# Patient Record
Sex: FEMALE
[De-identification: names, ages, dates, MRNs, and addresses within clinical notes are randomized per-mention and may not be internally consistent; named-entity substitution may affect disease eponyms.]

---

## 2023-08-24 ENCOUNTER — NON-APPOINTMENT (OUTPATIENT)
Age: 48
End: 2023-08-24

## 2023-10-20 ENCOUNTER — NON-APPOINTMENT (OUTPATIENT)
Age: 48
End: 2023-10-20

## 2024-01-08 ENCOUNTER — NON-APPOINTMENT (OUTPATIENT)
Age: 49
End: 2024-01-08

## 2024-01-26 ENCOUNTER — NON-APPOINTMENT (OUTPATIENT)
Age: 49
End: 2024-01-26

## 2024-03-17 ENCOUNTER — NON-APPOINTMENT (OUTPATIENT)
Age: 49
End: 2024-03-17

## 2024-04-23 PROBLEM — Z00.00 ENCOUNTER FOR PREVENTIVE HEALTH EXAMINATION: Status: ACTIVE | Noted: 2024-04-23

## 2024-04-25 ENCOUNTER — APPOINTMENT (OUTPATIENT)
Dept: OBGYN | Facility: CLINIC | Age: 49
End: 2024-04-25
Payer: COMMERCIAL

## 2024-04-25 VITALS
WEIGHT: 150 LBS | RESPIRATION RATE: 16 BRPM | HEART RATE: 90 BPM | TEMPERATURE: 98.9 F | HEIGHT: 70 IN | BODY MASS INDEX: 21.47 KG/M2 | OXYGEN SATURATION: 99 % | DIASTOLIC BLOOD PRESSURE: 90 MMHG | SYSTOLIC BLOOD PRESSURE: 133 MMHG

## 2024-04-25 DIAGNOSIS — Z30.9 ENCOUNTER FOR CONTRACEPTIVE MANAGEMENT, UNSPECIFIED: ICD-10-CM

## 2024-04-25 DIAGNOSIS — Z64.1 PROBLEMS RELATED TO MULTIPARITY: ICD-10-CM

## 2024-04-25 LAB
HCG UR QL: NEGATIVE
QUALITY CONTROL: YES

## 2024-04-25 PROCEDURE — 81025 URINE PREGNANCY TEST: CPT

## 2024-04-25 PROCEDURE — 99213 OFFICE O/P EST LOW 20 MIN: CPT

## 2024-04-26 ENCOUNTER — NON-APPOINTMENT (OUTPATIENT)
Age: 49
End: 2024-04-26

## 2024-04-28 NOTE — HISTORY OF PRESENT ILLNESS
[N] : Patient reports normal menses [Y] : Positive pregnancy history [Yes] : Patient has concerns regarding sex [Currently Active] : currently active [Men] : men [Mammogramdate] : 8/2023 [LMPDate] : 04/24/2024 [MensesFreq] : 28 [PGHxTotal] : 2 [MensesLength] : 5 [[facility] Corporation[facility]] : 2 [PGHxMultBirths] : 1

## 2024-04-28 NOTE — PLAN
[FreeTextEntry1] : Pt previously underwent a consultation for contraception management at the previous office. she has a h/o a CVA, and is not a candidate for a hormonal IUD. The patient presented for insertion of the paraguard IUD. The paraguard IUD was not available at this office. She was rescheduled to return in 1 week for insertion

## 2024-05-02 ENCOUNTER — APPOINTMENT (OUTPATIENT)
Dept: OBGYN | Facility: CLINIC | Age: 49
End: 2024-05-02
Payer: COMMERCIAL

## 2024-05-02 VITALS
WEIGHT: 150 LBS | SYSTOLIC BLOOD PRESSURE: 128 MMHG | DIASTOLIC BLOOD PRESSURE: 89 MMHG | HEART RATE: 83 BPM | HEIGHT: 70 IN | BODY MASS INDEX: 21.47 KG/M2

## 2024-05-02 DIAGNOSIS — Z30.430 ENCOUNTER FOR INSERTION OF INTRAUTERINE CONTRACEPTIVE DEVICE: ICD-10-CM

## 2024-05-02 PROCEDURE — 58300 INSERT INTRAUTERINE DEVICE: CPT

## 2024-05-02 NOTE — PROCEDURE
[IUD Placement] : intrauterine device (IUD) placement [Abnormal Uterine Bleeding] : abnormal uterine bleeding [Risks] : risks [Benefits] : benefits [Alternatives] : alternatives [de-identified] : Thickened endometrium

## 2024-05-03 ENCOUNTER — RESULT REVIEW (OUTPATIENT)
Age: 49
End: 2024-05-03

## 2024-05-03 ENCOUNTER — OUTPATIENT (OUTPATIENT)
Dept: OUTPATIENT SERVICES | Facility: HOSPITAL | Age: 49
LOS: 1 days | End: 2024-05-03
Payer: COMMERCIAL

## 2024-05-03 ENCOUNTER — APPOINTMENT (OUTPATIENT)
Dept: ULTRASOUND IMAGING | Facility: CLINIC | Age: 49
End: 2024-05-03

## 2024-05-03 DIAGNOSIS — Z00.00 ENCOUNTER FOR GENERAL ADULT MEDICAL EXAMINATION WITHOUT ABNORMAL FINDINGS: ICD-10-CM

## 2024-05-03 PROCEDURE — 76830 TRANSVAGINAL US NON-OB: CPT

## 2024-05-03 PROCEDURE — 76830 TRANSVAGINAL US NON-OB: CPT | Mod: 26

## 2024-05-06 ENCOUNTER — NON-APPOINTMENT (OUTPATIENT)
Age: 49
End: 2024-05-06

## 2024-05-06 DIAGNOSIS — Z92.89 PERSONAL HISTORY OF OTHER MEDICAL TREATMENT: ICD-10-CM

## 2024-05-06 DIAGNOSIS — N90.89 OTHER SPECIFIED NONINFLAMMATORY DISORDERS OF VULVA AND PERINEUM: ICD-10-CM

## 2024-05-06 DIAGNOSIS — Z78.9 OTHER SPECIFIED HEALTH STATUS: ICD-10-CM

## 2024-05-06 DIAGNOSIS — Z87.898 PERSONAL HISTORY OF OTHER SPECIFIED CONDITIONS: ICD-10-CM

## 2024-05-06 DIAGNOSIS — Z82.3 FAMILY HISTORY OF STROKE: ICD-10-CM

## 2024-06-07 ENCOUNTER — APPOINTMENT (OUTPATIENT)
Dept: OBGYN | Facility: CLINIC | Age: 49
End: 2024-06-07

## 2024-06-10 ENCOUNTER — APPOINTMENT (OUTPATIENT)
Dept: OBGYN | Facility: CLINIC | Age: 49
End: 2024-06-10
Payer: COMMERCIAL

## 2024-06-10 VITALS
BODY MASS INDEX: 30.21 KG/M2 | SYSTOLIC BLOOD PRESSURE: 120 MMHG | DIASTOLIC BLOOD PRESSURE: 80 MMHG | WEIGHT: 160 LBS | HEIGHT: 61 IN

## 2024-06-10 DIAGNOSIS — T83.89XA OTHER SPECIFIED COMPLICATION OF GENITOURINARY PROSTHETIC DEVICES, IMPLANTS AND GRAFTS, INITIAL ENCOUNTER: ICD-10-CM

## 2024-06-10 DIAGNOSIS — N92.0 OTHER SPECIFIED COMPLICATION OF GENITOURINARY PROSTHETIC DEVICES, IMPLANTS AND GRAFTS, INITIAL ENCOUNTER: ICD-10-CM

## 2024-06-10 PROCEDURE — 99213 OFFICE O/P EST LOW 20 MIN: CPT

## 2024-06-10 NOTE — PLAN
[FreeTextEntry1] : Para 2-0-0-3 status post insertion of ParaGard intrauterine device May 2 presents complaining of persistent vaginal bleeding since insertion.  The patient denies pain.  Her physical examination is unremarkable.  There is no evidence of endometritis or cervicitis.  A transvaginal pelvic ultrasound performed May 3, 2024 confirms proper positioning of the intrauterine device.  A 0.9 cm posterior intramural myoma was noted.  Nonetheless the patient was advised that this would not be the etiology of the bleeding.  The patient was counseled that abnormal uterine bleeding post insertion of intrauterine devices are common, and in time her bleeding profile should improve.  She denies signs or symptoms of anemia.  The plan is to begin a menstrual calendar diary.  The patient will follow-up in 3 months for review.  She was advised to contact me sooner with any problems.

## 2024-06-10 NOTE — HISTORY OF PRESENT ILLNESS
[TextBox_4] : 48-year-old  2 para 2-0-0-3 status post insertion of ParaGard intrauterine device May 2, 2024, presents complaining of persistent vaginal bleeding.  She denies pain.

## 2024-06-10 NOTE — REVIEW OF SYSTEMS
[FreeTextEntry8] : Persistent vaginal bleeding status post insertion of ParaGard intrauterine device

## 2024-06-10 NOTE — PHYSICAL EXAM
[Soft] : soft [Non-tender] : non-tender [Non-distended] : non-distended [No HSM] : No HSM [No Mass] : no mass [Labia Majora] : normal [Labia Minora] : normal [Moderate] : There was moderate vaginal bleeding [Normal] : normal [Uterine Adnexae] : normal

## 2024-08-06 ENCOUNTER — APPOINTMENT (OUTPATIENT)
Dept: OBGYN | Facility: CLINIC | Age: 49
End: 2024-08-06

## 2024-08-14 DIAGNOSIS — R92.30 DENSE BREASTS, UNSPECIFIED: ICD-10-CM

## 2024-09-12 ENCOUNTER — APPOINTMENT (OUTPATIENT)
Dept: OBGYN | Facility: CLINIC | Age: 49
End: 2024-09-12
Payer: COMMERCIAL

## 2024-09-12 VITALS
HEIGHT: 61 IN | HEART RATE: 81 BPM | DIASTOLIC BLOOD PRESSURE: 82 MMHG | WEIGHT: 150 LBS | SYSTOLIC BLOOD PRESSURE: 119 MMHG | BODY MASS INDEX: 28.32 KG/M2

## 2024-09-12 DIAGNOSIS — T83.9XXA UNSPECIFIED COMPLICATION OF GENITOURINARY PROSTHETIC DEVICE, IMPLANT AND GRAFT, INITIAL ENCOUNTER: ICD-10-CM

## 2024-09-12 DIAGNOSIS — N92.0 OTHER SPECIFIED COMPLICATION OF GENITOURINARY PROSTHETIC DEVICES, IMPLANTS AND GRAFTS, INITIAL ENCOUNTER: ICD-10-CM

## 2024-09-12 DIAGNOSIS — Z78.9 OTHER SPECIFIED HEALTH STATUS: ICD-10-CM

## 2024-09-12 DIAGNOSIS — T83.89XA OTHER SPECIFIED COMPLICATION OF GENITOURINARY PROSTHETIC DEVICES, IMPLANTS AND GRAFTS, INITIAL ENCOUNTER: ICD-10-CM

## 2024-09-12 PROCEDURE — 99213 OFFICE O/P EST LOW 20 MIN: CPT

## 2024-09-12 NOTE — HISTORY OF PRESENT ILLNESS
[TextBox_4] : 49-year-old  2 para  LMP: 2024, presents for a follow-up visit.  A ParaGard intrauterine device was inserted May 2, 2024.  At the first follow-up appointment, the patient complained of persistent vaginal bleeding.  A transvaginal pelvic ultrasound was performed which confirmed proper positioning of the device.  Nevertheless, on today's visit, the patient reports resolution of the abnormal uterine bleeding.  Her menstrual cycles are presently normal and interval and duration.  She denies dysmenorrhea.

## 2024-09-12 NOTE — PLAN
[FreeTextEntry1] : 49-year-old  2 para  status post insertion of ParaGard intrauterine device, initially complicated by prolonged uterine bleeding.  The symptoms have resolved.

## 2024-09-12 NOTE — PHYSICAL EXAM
[Chaperone Present] : A chaperone was present in the examining room during all aspects of the physical examination [Oriented x3] : oriented x3 [No Lesions] : no lesions  [Labia Majora] : normal [Labia Minora] : normal [No Bleeding] : There was no active vaginal bleeding [Normal] : normal [IUD String] : an IUD string was noted [FreeTextEntry2] : Appropriately responsive, alert, and no acute distress. [FreeTextEntry7] : Soft. Nondistended. Nontender. No CVA tenderness. No rebound or guarding. There are no palpable masses. [FreeTextEntry1] : External genitalia are within normal limits with no lesions visualized. [FreeTextEntry4] : Scant amount of blood was noted in the vaginal vault. No discharge or lesions noted. [FreeTextEntry5] : A speculum was inserted without any difficulty. The cervix was normal in appearance. IUD string was visible. No cervical motion tenderness. [FreeTextEntry6] : Bimanual examination is notable for a normal size, nontender uterus.  There were no palpable adnexal masses or adnexal tenderness.

## 2025-03-27 ENCOUNTER — APPOINTMENT (OUTPATIENT)
Age: 50
End: 2025-03-27
Payer: MEDICARE

## 2025-03-27 VITALS
DIASTOLIC BLOOD PRESSURE: 83 MMHG | HEART RATE: 77 BPM | BODY MASS INDEX: 30.04 KG/M2 | SYSTOLIC BLOOD PRESSURE: 132 MMHG | WEIGHT: 159.13 LBS | HEIGHT: 61 IN

## 2025-03-27 DIAGNOSIS — Z12.39 ENCOUNTER FOR OTHER SCREENING FOR MALIGNANT NEOPLASM OF BREAST: ICD-10-CM

## 2025-03-27 DIAGNOSIS — R92.30 DENSE BREASTS, UNSPECIFIED: ICD-10-CM

## 2025-03-27 DIAGNOSIS — Z01.411 ENCOUNTER FOR GYNECOLOGICAL EXAMINATION (GENERAL) (ROUTINE) WITH ABNORMAL FINDINGS: ICD-10-CM

## 2025-03-27 PROCEDURE — G0101: CPT

## 2025-03-27 PROCEDURE — 99396 PREV VISIT EST AGE 40-64: CPT | Mod: GY

## 2025-03-28 LAB — HPV HIGH+LOW RISK DNA PNL CVX: NOT DETECTED

## 2025-04-02 LAB — CYTOLOGY CVX/VAG DOC THIN PREP: ABNORMAL

## 2025-04-03 ENCOUNTER — ASOB RESULT (OUTPATIENT)
Age: 50
End: 2025-04-03

## 2025-04-03 ENCOUNTER — NON-APPOINTMENT (OUTPATIENT)
Age: 50
End: 2025-04-03

## 2025-04-03 ENCOUNTER — APPOINTMENT (OUTPATIENT)
Age: 50
End: 2025-04-03
Payer: MEDICARE

## 2025-04-03 ENCOUNTER — LABORATORY RESULT (OUTPATIENT)
Age: 50
End: 2025-04-03

## 2025-04-03 DIAGNOSIS — N93.9 ABNORMAL UTERINE AND VAGINAL BLEEDING, UNSPECIFIED: ICD-10-CM

## 2025-04-03 DIAGNOSIS — D64.9 ANEMIA, UNSPECIFIED: ICD-10-CM

## 2025-04-03 LAB
APTT BLD: 29.6 SEC
FSH SERPL-MCNC: 8 IU/L
HCT VFR BLD CALC: 34.4 %
HGB BLD-MCNC: 10.2 G/DL
INR PPP: 1.12 RATIO
MCHC RBC-ENTMCNC: 21.6 PG
MCHC RBC-ENTMCNC: 29.7 G/DL
MCV RBC AUTO: 72.7 FL
PLATELET # BLD AUTO: 328 K/UL
PT BLD: 13.2 SEC
RBC # BLD: 4.73 M/UL
RBC # FLD: 15.8 %
TSH SERPL-ACNC: 1.13 UIU/ML
WBC # FLD AUTO: 6.88 K/UL

## 2025-04-03 PROCEDURE — ZZZZZ: CPT

## 2025-04-03 PROCEDURE — 76830 TRANSVAGINAL US NON-OB: CPT

## 2025-04-29 ENCOUNTER — APPOINTMENT (OUTPATIENT)
Age: 50
End: 2025-04-29
Payer: MEDICARE

## 2025-04-29 VITALS
SYSTOLIC BLOOD PRESSURE: 130 MMHG | DIASTOLIC BLOOD PRESSURE: 90 MMHG | HEIGHT: 61 IN | BODY MASS INDEX: 29.27 KG/M2 | HEART RATE: 92 BPM | WEIGHT: 155 LBS

## 2025-04-29 DIAGNOSIS — N93.9 ABNORMAL UTERINE AND VAGINAL BLEEDING, UNSPECIFIED: ICD-10-CM

## 2025-04-29 LAB
HCG UR QL: NEGATIVE
QUALITY CONTROL: YES

## 2025-04-29 PROCEDURE — 81025 URINE PREGNANCY TEST: CPT

## 2025-04-29 PROCEDURE — 58558Z: CUSTOM

## 2025-05-02 DIAGNOSIS — R39.9 UNSPECIFIED SYMPTOMS AND SIGNS INVOLVING THE GENITOURINARY SYSTEM: ICD-10-CM

## 2025-05-02 RX ORDER — NITROFURANTOIN (MONOHYDRATE/MACROCRYSTALS) 25; 75 MG/1; MG/1
100 CAPSULE ORAL
Qty: 10 | Refills: 0 | Status: ACTIVE | COMMUNITY
Start: 2025-05-02 | End: 1900-01-01

## 2025-05-06 LAB — CORE LAB BIOPSY: NORMAL

## 2025-05-28 DIAGNOSIS — N76.0 ACUTE VAGINITIS: ICD-10-CM

## 2025-05-28 RX ORDER — FLUCONAZOLE 150 MG/1
150 TABLET ORAL
Qty: 4 | Refills: 0 | Status: ACTIVE | COMMUNITY
Start: 2025-05-28 | End: 1900-01-01

## 2025-05-30 ENCOUNTER — APPOINTMENT (OUTPATIENT)
Age: 50
End: 2025-05-30

## 2025-07-06 ENCOUNTER — NON-APPOINTMENT (OUTPATIENT)
Age: 50
End: 2025-07-06

## 2025-07-10 ENCOUNTER — LABORATORY RESULT (OUTPATIENT)
Age: 50
End: 2025-07-10

## 2025-07-10 ENCOUNTER — APPOINTMENT (OUTPATIENT)
Age: 50
End: 2025-07-10
Payer: MEDICARE

## 2025-07-10 VITALS
HEIGHT: 61 IN | SYSTOLIC BLOOD PRESSURE: 118 MMHG | BODY MASS INDEX: 29.27 KG/M2 | HEART RATE: 101 BPM | WEIGHT: 155 LBS | DIASTOLIC BLOOD PRESSURE: 80 MMHG

## 2025-07-10 PROBLEM — N89.8 LEUKORRHEA: Status: ACTIVE | Noted: 2025-07-10

## 2025-07-10 PROCEDURE — 99213 OFFICE O/P EST LOW 20 MIN: CPT

## 2025-07-13 LAB — BACTERIA UR CULT: NORMAL

## 2025-07-14 LAB
A VAGINAE DNA VAG QL NAA+PROBE: ABNORMAL
BVAB2 DNA VAG QL NAA+PROBE: NORMAL
C KRUSEI DNA VAG QL NAA+PROBE: NEGATIVE
C TRACH RRNA SPEC QL NAA+PROBE: NEGATIVE
CANDIDA DNA VAG QL NAA+PROBE: NEGATIVE
MEGA1 DNA VAG QL NAA+PROBE: NORMAL
N GONORRHOEA RRNA SPEC QL NAA+PROBE: NEGATIVE
T VAGINALIS RRNA SPEC QL NAA+PROBE: NEGATIVE

## 2025-08-04 ENCOUNTER — APPOINTMENT (OUTPATIENT)
Age: 50
End: 2025-08-04
Payer: MEDICARE

## 2025-08-04 VITALS
RESPIRATION RATE: 16 BRPM | DIASTOLIC BLOOD PRESSURE: 100 MMHG | BODY MASS INDEX: 29.29 KG/M2 | SYSTOLIC BLOOD PRESSURE: 147 MMHG | WEIGHT: 155 LBS | HEART RATE: 87 BPM | OXYGEN SATURATION: 100 %

## 2025-08-04 DIAGNOSIS — N93.9 ABNORMAL UTERINE AND VAGINAL BLEEDING, UNSPECIFIED: ICD-10-CM

## 2025-08-04 PROCEDURE — 99213 OFFICE O/P EST LOW 20 MIN: CPT

## 2025-08-04 PROCEDURE — 99459 PELVIC EXAMINATION: CPT

## 2025-08-13 ENCOUNTER — APPOINTMENT (OUTPATIENT)
Age: 50
End: 2025-08-13